# Patient Record
Sex: FEMALE | Race: WHITE | NOT HISPANIC OR LATINO | Employment: FULL TIME | URBAN - METROPOLITAN AREA
[De-identification: names, ages, dates, MRNs, and addresses within clinical notes are randomized per-mention and may not be internally consistent; named-entity substitution may affect disease eponyms.]

---

## 2018-11-05 ENCOUNTER — APPOINTMENT (OUTPATIENT)
Dept: RADIOLOGY | Facility: MEDICAL CENTER | Age: 53
End: 2018-11-05
Attending: EMERGENCY MEDICINE
Payer: OTHER MISCELLANEOUS

## 2018-11-05 ENCOUNTER — HOSPITAL ENCOUNTER (EMERGENCY)
Facility: MEDICAL CENTER | Age: 53
End: 2018-11-05
Attending: EMERGENCY MEDICINE
Payer: OTHER MISCELLANEOUS

## 2018-11-05 VITALS
WEIGHT: 192 LBS | HEIGHT: 64 IN | BODY MASS INDEX: 32.78 KG/M2 | DIASTOLIC BLOOD PRESSURE: 101 MMHG | RESPIRATION RATE: 14 BRPM | TEMPERATURE: 98.2 F | OXYGEN SATURATION: 94 % | HEART RATE: 84 BPM | SYSTOLIC BLOOD PRESSURE: 139 MMHG

## 2018-11-05 DIAGNOSIS — S09.90XA CLOSED HEAD INJURY, INITIAL ENCOUNTER: ICD-10-CM

## 2018-11-05 DIAGNOSIS — S93.402A SPRAIN OF LEFT ANKLE, UNSPECIFIED LIGAMENT, INITIAL ENCOUNTER: ICD-10-CM

## 2018-11-05 PROCEDURE — 73610 X-RAY EXAM OF ANKLE: CPT | Mod: LT

## 2018-11-05 PROCEDURE — 99284 EMERGENCY DEPT VISIT MOD MDM: CPT

## 2018-11-05 ASSESSMENT — PAIN SCALES - GENERAL: PAINLEVEL_OUTOF10: 5

## 2018-11-05 NOTE — ED TRIAGE NOTES
53 y/o female bib wheelchair for evaluation of left ankle pain and pain to her head after pt had a fall today. Pt states she was walking and stepped on a rock causing her to twist her ankle and fall to the ground hitting her head. Pt denies loc.

## 2018-11-06 NOTE — DISCHARGE INSTRUCTIONS
Head Injuries, Adult    Return for worsening headache, repeated vomiting, confusion, seizure, unequal pupils or difficulty arousing from sleep.  Avoid activities that may cause a repeat concussion for 1-2 weeks.    You had a borderline or high normal blood pressure reading today.  This does not necessarily mean you have hypertension.  Please followup with your/a primary physician for comprehensive blood pressure evaluation and yearly fasting cholesterol assessment.  BP Readings from Last 3 Encounters:   11/05/18 156/72         You have had a head injury which does not appear serious at this time. A concussion is a state of changed mental ability, usually from a blow to the head. You should take clear liquids for the rest of the day and then resume your regular diet. You should not take sedatives or alcoholic beverages for 48 hours after discharge. After injuries such as yours, most problems occur within the first 24 hours.    THESE MINOR SYMPTOMS MAY BE SEEN AFTER DISCHARGE:  Memory difficulties  Dizziness  Headaches Double vision  Hearing difficulties   Depression Tiredness  Weakness  Difficulty with concentration      If you experience any of these problems, you should not be alarmed. A bruise on the brain (concussion) requires a few days for recovery. This is the same as a bruise elsewhere on the body. Many patients with head injuries frequently experience such symptoms. Usually, these problems disappear without medical care. If symptoms last for more than one day, notify your caregiver. See your caregiver sooner if symptoms are becoming worse rather than better.    HOME CARE INSTRUCTIONS  During the next 24 hours you must stay with someone who can watch you for the above warning signs.  This person should wake you up every 30 minutes for 3 hours or as directed to check on your condition, noting any of the above signs or symptoms. Problems which are getting worse mean you should call or return immediately to the  facility where you were just seen, or to the nearest emergency department. In case of emergency or unconsciousness, dial 911.    Although it is unlikely that serious side effects will occur, you should be aware of signs and symptoms which may necessitate your return to this location. Side effects may occur up to 7 - 10 days following the injury.  It is important for you to carefully monitor your condition and contact your caregiver or seek immediate medical attention if there is a change in your condition.    SEEK IMMEDIATE MEDICAL ATTENTION IF:  There is confusion or drowsiness.   You can not awaken the injured person.  There is nausea (feeling sick to your stomach) or continued, forceful vomiting.  You notice dizziness or unsteadiness which is getting worse, or inability to walk.  You have convulsions or unconsciousness.  You experience severe, persistent headaches not relieved by Tylenol®. (Do not take aspirin as this impairs clotting abilities). Take other pain medications only as directed.  You can not use arms or legs normally.  There are changes in pupil sizes. (This is the black center in the colored part of the eye)  There is clear or bloody discharge from the nose or ears.    AGREEMENT BETWEEN PATIENT AND HEALTHCARE TEAM:  Your signature on this document represents an understanding between you and the healthcare team that took care of you today.  That means that you:  Understand these discharge instructions.   Will monitor your condition.  Will seek immediate medical attention as instructed.    Document Released: 12/18/2006  Document Re-Released: 06/30/2008  [employer]® Patient Information ©2009 [employer].    Ankle Sprain    Ice the ankle 15 minutes at a time 4-6 times daily the first two days.  Elevate and rest the ankle as much as possible.  Use splint and/or crutches if helpful and while needed.  Take ibuprofen 800 mg and Tylenol 650 mg as needed for pain.  Followup if not much better in 7 days for  repeat xrays.    You had an elevated or high normal blood pressure reading today.  This does not necessarily mean you have hypertension.  Please followup with your/a primary physician for comprehensive blood pressure evaluation.  BP Readings from Last 3 Encounters:   11/05/18 156/72         Your caregiver has diagnosed you as suffering from an ankle sprain. Ankle sprain occurs when the ligaments that hold the ankle joint together are stretched or torn. It may take 4 to 6 weeks to heal.    HOME CARE INSTRUCTIONS  Apply ice to the sore area for 15 to 20 minutes four times per day while awake for the first 2 days. Put the ice in a plastic bag and place a towel between the bag of ice and your skin.  After 2 days, you may apply heat to the injury to help relieve pain. You may use a warm heating pad for 15 to 20 minutes four times per day while awake for 2 days or as needed. Do not sleep with a heating pad. If you are diabetic, do not use a heating pad unless instructed to do so.  Keep your leg elevated when possible to lessen swelling.  For Activity: Use crutches with non-weight bearing for 1 week. Then, you may walk on your ankle as the pain allows, or as instructed. Start gradually with weight bearing on the affected ankle.  Continue to use crutches or cane until you can stand on your ankle without causing pain.  If a plaster splint was applied, wear the splint until you are seen for a follow-up examination. Rest it on nothing harder than a pillow the first 24 hours. Do not put weight on it. Do not get it wet. You may take it off to take a shower or bath.   If an air splint was applied, you may blow more air in it or take some out to make it more comfortable. You may take it off at night and to take a shower or bath.  Wiggle your toes in the splint several times per day if you are able.  You may have been given an elastic bandage to use with the plaster splint or alone. The splint is too tight if you have numbness,  tingling, or if your foot becomes cold and blue. Adjust the bandage to make it comfortable.  Take medications as directed by your caregiver. Use acetaminophen (Tylenol®)or ibuprofen (Advil® or Motrin®) as needed for pain.    CALL IF:  You have an increase in bruising, swelling or pain.  You notice coldness of your toes.  Pain relief is not obtained with medications.    SEEK IMMEDIATE MEDICAL ATTENTION IF: your toes are numb or blue or you have severe pain.      [employer]® Patient Information ©2007 [employer].

## 2018-11-06 NOTE — ED PROVIDER NOTES
"ED Provider Note    CHIEF COMPLAINT  Chief Complaint   Patient presents with   • T-5000 FALL   • Ankle Injury     left   • Head Injury     no loc       HPI  Alondra Bryant is a 52 y.o. female who presents after a fall tumbling down a driveway secondary to tripping over a rock.  Patient complains of left lateral ankle pain worse with walking but she can walk.  She struck her left parietal scalp, sustained no loss of consciousness, but has some numbness of the scalp.  No loss of consciousness, dazed period, or amnestic...  Denies other injury other than abrasion over the dorsum of the left knee.  No blood thinner use.    REVIEW OF SYSTEMS  Pertinent positives include: Left lateral ankle pain and swelling, head injury.  Pertinent negatives include: Neck pain, back pain, chest pain, abdominal pain, hip pain, right leg pain, arm injury, weakness, numbness.    PAST MEDICAL HISTORY  Denies    SOCIAL HISTORY  Here volunteering for the election.    CURRENT MEDICATIONS  Home Medications     Reviewed by Benji Barragan R.N. (Registered Nurse) on 11/05/18 at 1539  Med List Status: Complete   Medication Last Dose Status        Patient Tevin Taking any Medications                       ALLERGIES  No Known Allergies    PHYSICAL EXAM  VITAL SIGNS: /72   Pulse (!) 121   Temp 36.8 °C (98.2 °F) (Temporal)   Resp 16   Ht 1.626 m (5' 4\")   Wt 87.1 kg (192 lb)   SpO2 95%   BMI 32.96 kg/m²   Constitutional :  Well developed, Well nourished, elevated blood pressure, tachycardic but otherwise well-appearing.   HNT: No scalp hematomas or wounds, no CSF leaks.   Ears: No hemotympanum.  Eyes: pupils reactive without eye discharge nor conjunctival hyperemia.  Neck: Normal range of motion, No tenderness, Supple, No stridor.   Cardiovascular: Regular rhythm, No murmurs, No rubs, No gallops.  No cyanosis.   Respiratory: No rales, rhonchi, wheeze.  No chest tenderness  Abdomen:  Soft, nontender  Skin: Warm, dry, no erythema, " no rash.   Musculoskeletal: Large of edema over left lateral ankle with tenderness over the lateral malleolus talofibular and talocalcaneal ligaments.  No medial tenderness foot tenderness heel tenderness or proximal leg tenderness.  Extremities otherwise atraumatic.  Neurologic: GCS 15, oriented to person place time and events.  Grasp, biceps, extensor hallucis longus and ankle plantarflexion symmetric except for limited by pain on left.  Sensation intact to light touch left foot.  Cranial nerves II through XII are intact.    RADIOLOGY:  DX-ANKLE 3+ VIEWS LEFT   Final Result      No acute osseous abnormality.          COURSE & MEDICAL DECISION MAKING  Well-appearing patient presents after a fall with a minor closed head injury without definite concussion and a left ankle sprain without fracture dislocation.  By [place] head rules she is low enough risk that CT imaging not necessary.  Her heart rate spontaneously improved to 84 suggesting her tachycardia was due to pain or anxiety on arrival.    This patient has borderline or elevated blood pressure as recorded above and was instructed to followup with primary physician for comprehensive blood pressure evaluation and yearly fasting cholesterol assessment according to to CMS protocol.    PLAN:  Ibuprofen and Tylenol  Velcro air splint and crutches  Head injury and ankle sprain handout given  Return for signs of potentially significant head injury  Follow-up:  your doctor or ortho if not better one week            CONDITION:  Good.    FINAL IMPRESSION:  1. Closed head injury, initial encounter    2. Sprain of left ankle, unspecified ligament, initial encounter    3.      Fall, initial encounter      Electronically signed by: Mario Lewis, 11/5/2018

## 2018-11-06 NOTE — ED NOTES
Discharge orders received from [facility]. No new prescriptions received. Provided education and patient demonstrated understanding. Pt ambulatory off unit with crutches. All personal belonging with patient.